# Patient Record
Sex: MALE | Race: WHITE | Employment: OTHER
[De-identification: names, ages, dates, MRNs, and addresses within clinical notes are randomized per-mention and may not be internally consistent; named-entity substitution may affect disease eponyms.]

---

## 2017-06-17 ENCOUNTER — HEALTH MAINTENANCE LETTER (OUTPATIENT)
Age: 82
End: 2017-06-17

## 2017-09-01 LAB — CREAT SERPL-MCNC: 1.4 MG/DL (ref 0.7–1.2)

## 2017-09-07 LAB — TSH SERPL-ACNC: 12.7 UIU/ML (ref 0.3–5)

## 2018-07-25 LAB
CREAT SERPL-MCNC: 1.9 MG/DL (ref 0.7–1.2)
GLUCOSE SERPL-MCNC: 122 MG/DL (ref 74–106)
POTASSIUM SERPL-SCNC: 4.2 MMOL/L (ref 3.5–5)
TSH SERPL-ACNC: 13 UIU/ML (ref 0.3–5)

## 2018-10-24 LAB — TSH SERPL-ACNC: 6.68 UIU/ML (ref 0.3–5)

## 2019-04-22 LAB
CREAT SERPL-MCNC: 1.4 MG/DL (ref 0.7–1.2)
TSH SERPL-ACNC: 24.5 UIU/ML (ref 0.3–5)

## 2019-05-02 ENCOUNTER — TRANSFERRED RECORDS (OUTPATIENT)
Dept: HEALTH INFORMATION MANAGEMENT | Facility: CLINIC | Age: 84
End: 2019-05-02

## 2019-05-02 LAB
CREAT SERPL-MCNC: 1.4 MG/DL (ref 0.7–1.2)
EJECTION FRACTION: NORMAL %
GLUCOSE SERPL-MCNC: 122 MG/DL (ref 74–106)
POTASSIUM SERPL-SCNC: 3.9 MMOL/L (ref 3.5–5)

## 2019-06-03 LAB
ALT SERPL-CCNC: 13 U/L (ref 13–61)
AST SERPL-CCNC: 20 U/L (ref 15–37)
TSH SERPL-ACNC: 1.11 UIU/ML (ref 0.35–4.94)

## 2019-10-01 ENCOUNTER — HEALTH MAINTENANCE LETTER (OUTPATIENT)
Age: 84
End: 2019-10-01

## 2019-11-19 ENCOUNTER — TRANSFERRED RECORDS (OUTPATIENT)
Dept: HEALTH INFORMATION MANAGEMENT | Facility: CLINIC | Age: 84
End: 2019-11-19

## 2019-12-06 ASSESSMENT — ACTIVITIES OF DAILY LIVING (ADL): CURRENT_FUNCTION: NO ASSISTANCE NEEDED

## 2019-12-09 ENCOUNTER — OFFICE VISIT (OUTPATIENT)
Dept: INTERNAL MEDICINE | Facility: CLINIC | Age: 84
End: 2019-12-09
Payer: COMMERCIAL

## 2019-12-09 VITALS
HEIGHT: 69 IN | HEART RATE: 69 BPM | SYSTOLIC BLOOD PRESSURE: 136 MMHG | WEIGHT: 195.8 LBS | DIASTOLIC BLOOD PRESSURE: 80 MMHG | BODY MASS INDEX: 29 KG/M2 | OXYGEN SATURATION: 98 % | TEMPERATURE: 97.3 F

## 2019-12-09 DIAGNOSIS — N18.30 CKD (CHRONIC KIDNEY DISEASE) STAGE 3, GFR 30-59 ML/MIN (H): ICD-10-CM

## 2019-12-09 DIAGNOSIS — I48.20 CHRONIC ATRIAL FIBRILLATION (H): ICD-10-CM

## 2019-12-09 DIAGNOSIS — Z00.00 MEDICARE ANNUAL WELLNESS VISIT, SUBSEQUENT: Primary | ICD-10-CM

## 2019-12-09 DIAGNOSIS — I10 ESSENTIAL HYPERTENSION: ICD-10-CM

## 2019-12-09 DIAGNOSIS — E03.9 HYPOTHYROIDISM, UNSPECIFIED TYPE: ICD-10-CM

## 2019-12-09 DIAGNOSIS — I10 BENIGN ESSENTIAL HYPERTENSION: ICD-10-CM

## 2019-12-09 DIAGNOSIS — M10.9 GOUT, UNSPECIFIED CAUSE, UNSPECIFIED CHRONICITY, UNSPECIFIED SITE: ICD-10-CM

## 2019-12-09 PROCEDURE — G0439 PPPS, SUBSEQ VISIT: HCPCS | Performed by: INTERNAL MEDICINE

## 2019-12-09 RX ORDER — ALLOPURINOL 300 MG/1
300 TABLET ORAL DAILY
COMMUNITY
End: 2019-12-09

## 2019-12-09 RX ORDER — LEVOTHYROXINE SODIUM 125 UG/1
125 TABLET ORAL DAILY
COMMUNITY
End: 2019-12-09

## 2019-12-09 RX ORDER — ALLOPURINOL 300 MG/1
300 TABLET ORAL DAILY
COMMUNITY
Start: 2019-12-09

## 2019-12-09 RX ORDER — LEVOTHYROXINE SODIUM 125 UG/1
125 TABLET ORAL DAILY
COMMUNITY
Start: 2019-12-09

## 2019-12-09 RX ORDER — ATENOLOL 50 MG/1
50 TABLET ORAL DAILY
COMMUNITY
Start: 2019-12-09

## 2019-12-09 RX ORDER — LOSARTAN POTASSIUM 100 MG/1
100 TABLET ORAL DAILY
COMMUNITY
Start: 2019-12-09

## 2019-12-09 ASSESSMENT — ACTIVITIES OF DAILY LIVING (ADL): CURRENT_FUNCTION: NO ASSISTANCE NEEDED

## 2019-12-09 ASSESSMENT — MIFFLIN-ST. JEOR: SCORE: 1573.52

## 2019-12-09 NOTE — NURSING NOTE
"Chief Complaint   Patient presents with     Medicare Visit     /80   Pulse 69   Temp 97.3  F (36.3  C) (Temporal)   Ht 1.753 m (5' 9\")   Wt 88.8 kg (195 lb 12.8 oz)   SpO2 98%   BMI 28.91 kg/m   Estimated body mass index is 28.91 kg/m  as calculated from the following:    Height as of this encounter: 1.753 m (5' 9\").    Weight as of this encounter: 88.8 kg (195 lb 12.8 oz).        Health Maintenance that is potentially due pending provider review:  NONE    n/a    FLOYD Gill  "

## 2019-12-09 NOTE — PROGRESS NOTES
"SUBJECTIVE:   David Ortega is a 84 year old male who presents for Preventive Visit.      **The patient predominately receives his care at the [facility].  He wishes to continue obtaining all care related to all of his medical issues at the [facility].  He is here mainly for the annual Medicare wellness exam in case he ever needs to see a clinic outside the [facility].    **He does not want any blood tests or evaluation done above and beyond what is covered specifically in the annual Medicare wellness exam because he receives all his care at the [facility], and does not want to jeopardize any of his coverage out there.    **We do not have any of his medical records since his last visit here in 2010.      Are you in the first 12 months of your Medicare coverage?  No    Healthy Habits:     In general, how would you rate your overall health?  Good    Frequency of exercise:  2-3 days/week    Duration of exercise:  Less than 15 minutes    Do you usually eat at least 4 servings of fruit and vegetables a day, include whole grains    & fiber and avoid regularly eating high fat or \"junk\" foods?  No    Taking medications regularly:  Yes    Medication side effects:  None    Ability to successfully perform activities of daily living:  No assistance needed    Home Safety:  No safety concerns identified    Hearing Impairment:  Difficulty following a conversation in a noisy restaurant or crowded room, feel that people are mumbling or not speaking clearly, difficulty following dialogue in the theater, difficult to understand a speaker at a public meeting or Taoism service, need to ask people to speak up or repeat themselves, find that men's voices are easier to understand than woman's, difficulty understanding soft or whispered speech and difficulty understanding speech on the telephone    In the past 6 months, have you been bothered by leaking of urine?  No    In general, how would you rate your " overall mental or emotional health?  Good      PHQ-2 Total Score: 1    Do you feel safe in your environment? Yes    Have you ever done Advance Care Planning? (For example, a Health Directive, POLST, or a discussion with a medical provider or your loved ones about your wishes): No, advance care planning information given to patient to review.  Patient plans to discuss their wishes with loved ones or provider.  - has information at home       Fall risk  Fallen 2 or more times in the past year?: No  Any fall with injury in the past year?: No    Cognitive Screening   1) Repeat 3 items (Leader, Season, Table)    2) Clock draw: NORMAL  3) 3 item recall: Recalls 3 objects  Results: 3 items recalled: COGNITIVE IMPAIRMENT LESS LIKELY    Mini-CogTM Copyright MARCOS Landin. Licensed by the author for use in [facility]; reprinted with permission ([email or web address]). All rights reserved.      Do you have sleep apnea, excessive snoring or daytime drowsiness?: yes, daytime drowsiness     Reviewed and updated as needed this visit by clinical staff  Tobacco  Allergies  Meds  Med Hx  Surg Hx  Soc Hx        Reviewed and updated as needed this visit by Provider  Med Hx  Surg Hx        Social History     Tobacco Use     Smoking status: Former Smoker     Packs/day: 2.00     Years: 25.00     Pack years: 50.00     Types: Cigarettes     Last attempt to quit: 1982     Years since quittin.7     Smokeless tobacco: Never Used   Substance Use Topics     Alcohol use: Yes           AUDIT - Alcohol Use Disorders Identification Test - Reproduced from the World Health Organization Audit 2001 (Second Edition) 2019   1.  How often do you have a drink containing alcohol? 4 or more times a week   2.  How many drinks containing alcohol do you have on a typical day when you are drinking? 1 or 2   3.  How often do you have five or more drinks on one occasion? Less than monthly   4.  How often during the last year have you found that  you were not able to stop drinking once you had started? Never   5.  How often during the last year have you failed to do what was normally expected of you because of drinking? Never   6.  How often during the last year have you needed a first drink in the morning to get yourself going after a heavy drinking session? Never   7.  How often during the last year have you had a feeling of guilt or remorse after drinking? Never   8.  How often during the last year have you been unable to remember what happened the night before because of your drinking? Never   9.  Have you or someone else been injured because of your drinking? No   10. Has a relative, friend, doctor or other health care worker been concerned about your drinking or suggested you cut down? No   TOTAL SCORE 5           1.  History of chronic atrial fibrillation.  Followed by [facility] cardiology.  Recently had pacemaker placed at the [facility] for bradycardia. (Heart rate in 40s)  On Eliquis without side effects or complications.    2.  History of elevated glucose shortly after acute respiratory infection in 2010.  Was labeled his diabetic at that time however since that time he has not had any further elevated glucose levels.  His doctor at the [facility] his stated that he is no longer a type II diabetic.  He is not doing glucose monitoring.    3.  History of gout attack few years ago.  Had an elevated uric acid level, placed on allopurinol to lower uric acid levels.  Denies side effects.    Current providers sharing in care for this patient include:   Patient Care Team:  Duane Alex MD as PCP - General    The following health maintenance items are reviewed in Epic and correct as of today:  Health Maintenance   Topic Date Due     DTAP/TDAP/TD IMMUNIZATION (1 - Tdap) 09/16/1946     PNEUMOCOCCAL IMMUNIZATION 65+ LOW/MEDIUM RISK (2 of 2 - PCV13) 05/01/2005     ZOSTER IMMUNIZATION (2 of 3) 10/10/2011     MEDICARE ANNUAL WELLNESS  VISIT  12/09/2020     FALL RISK ASSESSMENT  12/09/2020     ADVANCE CARE PLANNING  12/09/2024     PHQ-2  Completed     INFLUENZA VACCINE  Completed     IPV IMMUNIZATION  Aged Out     MENINGITIS IMMUNIZATION  Aged Out       Past Medical History:  ---------------------------  Past Medical History:   Diagnosis Date     Gutierres's esophagus     per [facility] recrods     Benign essential hypertension      Chronic atrial fibrillation      CKD (chronic kidney disease) stage 3, GFR 30-59 ml/min (H) 2/8/2011     Esophageal reflux 1999     Gout 2015     History of complete heart block 05/02/2019    intermittent episodse of compelte heart block with junctional rhythm; PPM placed     Hypothyroidism, unspecified      IRON DEFIC ANEMIA NOS 5/21/2007     Pneumonia 1/26/2009     S/P placement of cardiac pacemaker 05/02/2019    palcement of PPM due to intermittent episodes of complete heart block with junctional rhythm     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    per biopsy, managed by [facility]; s/p MOHs procedure with clear margins       Past Surgical History:  ---------------------------  Past Surgical History:   Procedure Laterality Date     ANGIOGRAM  1999    minimal CAD     CA ANESTH,PACEMAKER INSERTION  05/02/2019    pacemaker implantation at [facility] for intermittent complete heart block with junctional rhythm     CATARACT IOL, RT/LT Bilateral 04/20/2017; 2/16/17     MOHS MICROGRAPHIC PROCEDURE  12/03/2018    squamous cell CA on scalp; per biopsy, s/p MOHs procedure with clear margins       Current Medications:  ---------------------------  Current Outpatient Medications   Medication Sig Dispense Refill     allopurinol (ZYLOPRIM) 300 MG tablet Take 1 tablet (300 mg) by mouth daily       apixaban ANTICOAGULANT (ELIQUIS) 5 MG tablet Take 1 tablet (5 mg) by mouth 2 times daily       atenolol (TENORMIN) 50 MG tablet Take 1 tablet (50 mg) by mouth daily       levothyroxine  (SYNTHROID/LEVOTHROID) 125 MCG tablet Take 1 tablet (125 mcg) by mouth daily       losartan (COZAAR) 100 MG tablet Take 1 tablet (100 mg) by mouth daily       OMEPRAZOLE CPCR 20 MG OR 1 QD 1 month 5     SIMVASTATIN 40 MG PO TABS 1 TABLET EVERY EVENING  3     ASPIRIN NOT PRESCRIBED, INTENTIONAL,   0       Allergies:  -------------  No Known Allergies    Social History:  -------------------  Social History     Socioeconomic History     Marital status:      Spouse name: Milla     Number of children: 4     Years of education: 14     Highest education level: Not on file   Occupational History     Occupation:      Employer: RETIRED   Social Needs     Financial resource strain: Not on file     Food insecurity:     Worry: Not on file     Inability: Not on file     Transportation needs:     Medical: Not on file     Non-medical: Not on file   Tobacco Use     Smoking status: Former Smoker     Packs/day: 2.00     Years: 25.00     Pack years: 50.00     Types: Cigarettes     Last attempt to quit: 1982     Years since quittin.7     Smokeless tobacco: Never Used   Substance and Sexual Activity     Alcohol use: Yes     Drug use: No     Sexual activity: Yes     Partners: Female   Lifestyle     Physical activity:     Days per week: Not on file     Minutes per session: Not on file     Stress: Not on file   Relationships     Social connections:     Talks on phone: Not on file     Gets together: Not on file     Attends Faith service: Not on file     Active member of club or organization: Not on file     Attends meetings of clubs or organizations: Not on file     Relationship status: Not on file     Intimate partner violence:     Fear of current or ex partner: Not on file     Emotionally abused: Not on file     Physically abused: Not on file     Forced sexual activity: Not on file   Other Topics Concern     Not on file   Social History Narrative     Not on file       Family Medical  "History:  ------------------------------  Family History   Problem Relation Age of Onset     Cancer Brother      KYLER. Father          in a fire        Review of Systems  Constitutional, HEENT, cardiovascular, pulmonary, gi and gu systems are negative, except as otherwise noted.    OBJECTIVE:   /80   Pulse 69   Temp 97.3  F (36.3  C) (Temporal)   Ht 1.753 m (5' 9\")   Wt 88.8 kg (195 lb 12.8 oz)   SpO2 98%   BMI 28.91 kg/m   Estimated body mass index is 28.91 kg/m  as calculated from the following:    Height as of this encounter: 1.753 m (5' 9\").    Weight as of this encounter: 88.8 kg (195 lb 12.8 oz).  Physical Exam  GENERAL alert and no distress  EYES:  Normal sclera,conjunctiva, EOMI  HENT: oral and posterior pharynx without lesions or erythema, facies symmetric  NECK: Neck supple. No LAD, without thyroidmegaly.  RESP: Clear to ausculation bilaterally without wheezes or crackles. Normal BS in all fields.  CV: Irregular rhythm ( consistent with known atrial fibrillation), regular rate; normal S1S2 without murmurs, rubs or gallops .  LYMPH: no cervical lymph adenopathy appreciated  MS: extremities- no gross deformities of the visible extremities noted,   EXT:  no lower extremity edema  PSYCH: Alert and oriented times 3; speech- coherent  SKIN:  No obvious significant skin lesions on visible portions of face         ASSESSMENT / PLAN:     (Z00.00) Medicare annual wellness visit, subsequent  (primary encounter diagnosis)  Comment: Discussed cardiac disease risk factors and cardiac disease risk factor modification, including diabetes screening, blood pressure screening (and management if indicated), and cholesterol screening.   Reviewed immunzation guidelines, including pneumococcal vaccines, annual influenza, and shingles vaccines.   Discussed routine cancer screenings, including skin cancer, colon cancer screening for everyone until age 80, prostate cancer screening in men until age 75, mammogram " and PAP/pelvic for women until age 75.   Recommended regular dentist visits to care for remaining teeth.   Recommended regular screening for vision and glaucoma.   Recommended safe driving and accident avoidance.   Plan:       **He does not want any blood tests or evaluation done above and beyond what is covered specifically in the annual Medicare wellness exam because he receives all his care at the [facility], and does not want to jeopardize any of his coverage out there.    **The diagnoses and orders listed below are mainly for the purpose of updating his chart and medication list.  We only very briefly discussed these items, not enough to bill for it.    (I48.20) Chronic atrial fibrillation  Comment: Followed by [facility].  Plan: apixaban ANTICOAGULANT (ELIQUIS) 5 MG tablet,         atenolol (TENORMIN) 50 MG tablet            (N18.3) CKD (chronic kidney disease) stage 3, GFR 30-59 ml/min (H)  Comment: Do not have any current blood tests in our system.  We will get records from the [facility].  Continue cares as ordered by the [facility].  Plan:     (I10) Benign essential hypertension  Comment: Appears stable, followed by the [facility].  Plan: losartan (COZAAR) 100 MG tablet, atenolol         (TENORMIN) 50 MG tablet            (E03.9) Hypothyroidism, unspecified type  Comment: Followed by [facility], continue medicines as ordered.  Plan: levothyroxine (SYNTHROID/LEVOTHROID) 125 MCG         tablet            (M10.9) Gout, unspecified cause, unspecified chronicity, unspecified site  Comment: Followed by [facility].  Continue medicines as ordered.  Plan: allopurinol (ZYLOPRIM) 300 MG tablet               COUNSELING:  Reviewed preventive health counseling, as reflected in patient instructions       Regular exercise       Healthy diet/nutrition       Vision screening       Hearing screening       Dental care       Fall risk prevention       Aspirin Prophylaxsis  (he  "should not take aspirin because of his Eliquis)    Recommended he look into getting the shingles (Shingrix) vaccine.  [facility] not [facility], and come to any pharmacy and get it.    Estimated body mass index is 28.91 kg/m  as calculated from the following:    Height as of this encounter: 1.753 m (5' 9\").    Weight as of this encounter: 88.8 kg (195 lb 12.8 oz).         reports that he quit smoking about 37 years ago. His smoking use included cigarettes. He has a 50.00 pack-year smoking history. He has never used smokeless tobacco.      Appropriate preventive services were discussed with this patient, including applicable screening as appropriate for cardiovascular disease, diabetes, osteopenia/osteoporosis, and glaucoma.  As appropriate for age/gender, discussed screening for colorectal cancer, prostate cancer, breast cancer, and cervical cancer. Checklist reviewing preventive services available has been given to the patient.    Reviewed patients plan of care and provided an AVS. The Basic Care Plan (routine screening as documented in Health Maintenance) for David meets the Care Plan requirement. This Care Plan has been established and reviewed with the Patient.    Counseling Resources:  ATP IV Guidelines  Pooled Cohorts Equation Calculator  Breast Cancer Risk Calculator  FRAX Risk Assessment  ICSI Preventive Guidelines  Dietary Guidelines for Americans, 2010  USDA's MyPlate  ASA Prophylaxis  Lung CA Screening    Guy Gr MD  [facility]    Identified Health Risks:  "

## 2019-12-09 NOTE — PATIENT INSTRUCTIONS
"*  Continue all medications at the same doses.  Contact your usual pharmacy if you need refills.     *  Get your labs done at the [facility] and continue getting your care there if you desire.      *  We will get information from the [facility] an update your chart in case it is ever needed.     *  You should look into physical therapy (through the [facility] or else through our [facility]).    *  I will check with your records from the [facility] to make sure that you are up to date         SHINGLES VACCINE:        I would recommend that you consider getting a \"shingles vaccine\".  The shingles vaccine does not stop you from getting shingles, but it decreases the intensity of the event, the duration of the event, and decreases the painful nerve condition that results     There are two options available for shingles vaccines:     --Shingrix: 2 shots, give 2-6 months apart  **recommended**   OR   --Zostavax, one shot       Based on the available data, the Shingrix vaccine has superior benefit and should be considered even if you have had the Zostavax vaccine before.         Contact your insurance provider and ask them if either shingles vaccine is covered and is so, how much it will cost you.  Usually this will be cheaper to get in a pharmacy given by the pharmacist.       Regardless of the coverage, I would recommend that you consider the vaccine since shingles is very painful, (just ask anyone who has ever had it).          Preventive Health Recommendations:   Male Ages 65 and over    Yearly exam:             See your health care provider every year in order to  o   Review health changes.   o   Discuss preventive care.    o   Review your medicines if your doctor has prescribed any.    Talk with your health care provider about whether you should have a test to screen for prostate cancer (PSA).    Every 3 years, have a diabetes test (fasting glucose). If you are at risk for diabetes, " "you should have this test more often.    Every 5 years, have a cholesterol test. Have this test more often if you are at risk for high cholesterol or heart disease.     Every 10 years, have a colonoscopy. Or, have a yearly FIT test (stool test). These exams will check for colon cancer.    Talk to with your health care provider about screening for Abdominal Aortic Aneurysm if you have a family history of AAA or have a history of smoking.    Shots:     Get a flu shot each year.     Get a tetanus shot every 10 years.     Talk to your doctor about your pneumonia vaccines. There are now two you should receive - Pneumovax (PPSV 23) and Prevnar (PCV 13).     Talk to your doctor about a shingles vaccine.     Talk to your doctor about the hepatitis B vaccine.  Nutrition:     Eat at least 5 servings of fruits and vegetables each day.     Eat whole-grain bread, whole-wheat pasta and brown rice instead of white grains and rice.     Talk to your provider about Calcium and Vitamin D.      --Good Grains:  Oats, brown rice, Quinoa (these do not raise the blood sugar as much)     --Bad grains:  Anything made from wheat or white rice     (because these raise the blood sugars significantly, and the possible gluten issue from wheat for some people).      --Proteins:  Aim for \"lean proteins\" including chicken, fish, seafood, pork, turkey, and eggs (in moderation); Eat red meat only occasionally    Vikram Hassan:                        Lifestyle    Exercise for at least 150 minutes a week (30 minutes a day, 5 days a week). This will help you control your weight and prevent disease.     Limit alcohol to one drink per day.     No smoking.     Wear sunscreen to prevent skin cancer.     See your dentist every six months for an exam and cleaning.     See your eye doctor every 1 to 2 years to screen for conditions such as glaucoma, macular degeneration, cataracts, etc           "

## 2019-12-19 PROBLEM — Z95.0 S/P PLACEMENT OF CARDIAC PACEMAKER: Status: ACTIVE | Noted: 2019-05-02

## 2019-12-19 PROBLEM — Z86.79 HISTORY OF COMPLETE HEART BLOCK: Status: ACTIVE | Noted: 2019-05-02

## 2021-01-15 ENCOUNTER — HEALTH MAINTENANCE LETTER (OUTPATIENT)
Age: 86
End: 2021-01-15